# Patient Record
Sex: FEMALE | Race: WHITE | NOT HISPANIC OR LATINO | URBAN - METROPOLITAN AREA
[De-identification: names, ages, dates, MRNs, and addresses within clinical notes are randomized per-mention and may not be internally consistent; named-entity substitution may affect disease eponyms.]

---
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
## 2025-02-07 ENCOUNTER — OFFICE VISIT (OUTPATIENT)
Dept: URGENT CARE | Facility: CLINIC | Age: 20
End: 2025-02-07
Payer: COMMERCIAL

## 2025-02-07 VITALS
DIASTOLIC BLOOD PRESSURE: 70 MMHG | OXYGEN SATURATION: 98 % | HEART RATE: 79 BPM | RESPIRATION RATE: 14 BRPM | BODY MASS INDEX: 23.37 KG/M2 | WEIGHT: 154.2 LBS | HEIGHT: 68 IN | TEMPERATURE: 97.5 F | SYSTOLIC BLOOD PRESSURE: 98 MMHG

## 2025-02-07 DIAGNOSIS — J03.80 ACUTE BACTERIAL TONSILLITIS: ICD-10-CM

## 2025-02-07 DIAGNOSIS — B96.89 ACUTE BACTERIAL TONSILLITIS: ICD-10-CM

## 2025-02-07 PROCEDURE — 3078F DIAST BP <80 MM HG: CPT | Performed by: NURSE PRACTITIONER

## 2025-02-07 PROCEDURE — 99213 OFFICE O/P EST LOW 20 MIN: CPT | Performed by: NURSE PRACTITIONER

## 2025-02-07 PROCEDURE — 3074F SYST BP LT 130 MM HG: CPT | Performed by: NURSE PRACTITIONER

## 2025-02-07 RX ORDER — DEXAMETHASONE 6 MG/1
6 TABLET ORAL DAILY
Qty: 5 TABLET | Refills: 0 | Status: SHIPPED | OUTPATIENT
Start: 2025-02-07 | End: 2025-02-12

## 2025-04-29 ENCOUNTER — HOSPITAL ENCOUNTER (OUTPATIENT)
Facility: MEDICAL CENTER | Age: 20
End: 2025-04-29
Attending: PHYSICIAN ASSISTANT
Payer: COMMERCIAL

## 2025-05-05 ENCOUNTER — HOSPITAL ENCOUNTER (OUTPATIENT)
Dept: LAB | Facility: MEDICAL CENTER | Age: 20
End: 2025-05-05
Attending: PHYSICIAN ASSISTANT
Payer: COMMERCIAL

## 2025-05-05 LAB
CHOLEST SERPL-MCNC: 148 MG/DL (ref 100–199)
DHEA-S SERPL-MCNC: 80.8 UG/DL (ref 148–407)
FSH SERPL-ACNC: 4.7 MIU/ML
HCG SERPL QL: NEGATIVE
HDLC SERPL-MCNC: 66 MG/DL
LDLC SERPL CALC-MCNC: 70 MG/DL
LH SERPL-ACNC: 10.5 IU/L
PROLACTIN SERPL-MCNC: 21.7 NG/ML (ref 2.8–26)
TRIGL SERPL-MCNC: 59 MG/DL (ref 0–149)
TSH SERPL-ACNC: 1.13 UIU/ML (ref 0.38–5.33)

## 2025-05-05 PROCEDURE — 84443 ASSAY THYROID STIM HORMONE: CPT

## 2025-05-05 PROCEDURE — 84146 ASSAY OF PROLACTIN: CPT

## 2025-05-05 PROCEDURE — 83001 ASSAY OF GONADOTROPIN (FSH): CPT

## 2025-05-05 PROCEDURE — 83002 ASSAY OF GONADOTROPIN (LH): CPT

## 2025-05-05 PROCEDURE — 80061 LIPID PANEL: CPT

## 2025-05-05 PROCEDURE — 84703 CHORIONIC GONADOTROPIN ASSAY: CPT

## 2025-05-05 PROCEDURE — 82627 DEHYDROEPIANDROSTERONE: CPT

## 2025-05-05 PROCEDURE — 36415 COLL VENOUS BLD VENIPUNCTURE: CPT

## 2025-05-05 PROCEDURE — 82166 ASSAY ANTI-MULLERIAN HORM: CPT

## 2025-05-09 LAB — MIS SERPL-MCNC: 7.99 NG/ML (ref 0.4–16.02)

## 2025-05-28 ENCOUNTER — OFFICE VISIT (OUTPATIENT)
Dept: URGENT CARE | Facility: CLINIC | Age: 20
End: 2025-05-28
Payer: COMMERCIAL

## 2025-05-28 VITALS
TEMPERATURE: 97.3 F | WEIGHT: 155.4 LBS | HEART RATE: 82 BPM | SYSTOLIC BLOOD PRESSURE: 90 MMHG | DIASTOLIC BLOOD PRESSURE: 60 MMHG | BODY MASS INDEX: 23.55 KG/M2 | OXYGEN SATURATION: 97 % | HEIGHT: 68 IN | RESPIRATION RATE: 13 BRPM

## 2025-05-28 DIAGNOSIS — N39.0 URINARY TRACT INFECTION WITHOUT HEMATURIA, SITE UNSPECIFIED: ICD-10-CM

## 2025-05-28 DIAGNOSIS — R30.0 DYSURIA: Primary | ICD-10-CM

## 2025-05-28 LAB
APPEARANCE UR: ABNORMAL
BILIRUB UR STRIP-MCNC: NEGATIVE MG/DL
COLOR UR AUTO: ABNORMAL
GLUCOSE UR STRIP.AUTO-MCNC: NEGATIVE MG/DL
KETONES UR STRIP.AUTO-MCNC: NEGATIVE MG/DL
LEUKOCYTE ESTERASE UR QL STRIP.AUTO: ABNORMAL
NITRITE UR QL STRIP.AUTO: NEGATIVE
PH UR STRIP.AUTO: 8.5 [PH] (ref 5–8)
POCT INT CON NEG: NEGATIVE
POCT INT CON POS: POSITIVE
POCT URINE PREGNANCY TEST: NEGATIVE
PROT UR QL STRIP: 100 MG/DL
RBC UR QL AUTO: ABNORMAL
SP GR UR STRIP.AUTO: 1.02
UROBILINOGEN UR STRIP-MCNC: 0.2 MG/DL

## 2025-05-28 PROCEDURE — 99213 OFFICE O/P EST LOW 20 MIN: CPT | Performed by: PHYSICIAN ASSISTANT

## 2025-05-28 PROCEDURE — 81002 URINALYSIS NONAUTO W/O SCOPE: CPT | Performed by: PHYSICIAN ASSISTANT

## 2025-05-28 PROCEDURE — 3074F SYST BP LT 130 MM HG: CPT | Performed by: PHYSICIAN ASSISTANT

## 2025-05-28 PROCEDURE — 81025 URINE PREGNANCY TEST: CPT | Performed by: PHYSICIAN ASSISTANT

## 2025-05-28 PROCEDURE — 3078F DIAST BP <80 MM HG: CPT | Performed by: PHYSICIAN ASSISTANT

## 2025-05-28 RX ORDER — CEFDINIR 300 MG/1
300 CAPSULE ORAL 2 TIMES DAILY
Qty: 10 CAPSULE | Refills: 0 | Status: SHIPPED | OUTPATIENT
Start: 2025-05-28 | End: 2025-06-02

## 2025-05-28 ASSESSMENT — ENCOUNTER SYMPTOMS
EYE REDNESS: 0
CHANGE IN BOWEL HABIT: 0
NAUSEA: 0
FEVER: 0
ABDOMINAL PAIN: 0
EYE DISCHARGE: 0
VOMITING: 0
FLANK PAIN: 0

## 2025-05-29 NOTE — PROGRESS NOTES
"Subjective     Amina Faust is a 19 y.o. female who presents with UTI (Sx started on Friday, discomfort, burning when urinating, pressure and pain, urinary frequency )            UTI  This is a new problem. Episode onset: x 5 days ago. The problem occurs constantly. Associated symptoms include urinary symptoms (The patient reports associated dysuria with urinary frequency and urinary urgency.  She reports no hematuria.  She also reports no flank pain.). Pertinent negatives include no abdominal pain, change in bowel habit, fever, nausea or vomiting. Treatments tried: OTC cranberry pills; OTC cranberry juice;     PMH:  has no past medical history on file.  MEDS: Current Medications[1]  ALLERGIES: Allergies[2]  SURGHX: Past Surgical History[3]  SOCHX:  reports that she has never smoked. She has never used smokeless tobacco. She reports that she does not drink alcohol and does not use drugs.  FH: Family history was reviewed, no pertinent findings to report      Review of Systems   Constitutional:  Negative for fever.   Eyes:  Negative for discharge and redness.   Gastrointestinal:  Negative for abdominal pain, change in bowel habit, nausea and vomiting.   Genitourinary:  Positive for dysuria, frequency and urgency. Negative for flank pain and hematuria.              Objective     BP 90/60 (BP Location: Left arm, Patient Position: Sitting, BP Cuff Size: Adult)   Pulse 82   Temp 36.3 °C (97.3 °F) (Temporal)   Resp 13   Ht 1.727 m (5' 8\")   Wt 70.5 kg (155 lb 6.4 oz)   SpO2 97%   BMI 23.63 kg/m²      Physical Exam  Constitutional:       General: She is not in acute distress.     Appearance: Normal appearance. She is well-developed. She is not ill-appearing.   HENT:      Head: Normocephalic and atraumatic.      Right Ear: External ear normal.      Left Ear: External ear normal.   Eyes:      Extraocular Movements: Extraocular movements intact.      Conjunctiva/sclera: Conjunctivae normal. "   Cardiovascular:      Rate and Rhythm: Normal rate and regular rhythm.      Heart sounds: Normal heart sounds.   Pulmonary:      Effort: Pulmonary effort is normal. No respiratory distress.      Breath sounds: Normal breath sounds. No wheezing.   Abdominal:      Palpations: Abdomen is soft.      Tenderness: There is no abdominal tenderness. There is no right CVA tenderness or left CVA tenderness.   Musculoskeletal:         General: Normal range of motion.      Cervical back: Normal range of motion and neck supple.   Skin:     General: Skin is warm and dry.   Neurological:      Mental Status: She is alert and oriented to person, place, and time.               Progress:  Results for orders placed or performed in visit on 05/28/25   POCT Urinalysis    Collection Time: 05/28/25  5:49 PM   Result Value Ref Range    POC Color other Negative    POC Appearance cloudy Negative    POC Glucose Negative Negative mg/dL    POC Bilirubin Negative Negative mg/dL    POC Ketones Negative Negative mg/dL    POC Specific Gravity 1.020 <1.005 - >1.030    POC Blood Trace Negative    POC Urine PH 8.5 (A) 5.0 - 8.0    POC Protein 100 Negative mg/dL    POC Urobiligen 0.2 Negative (0.2) mg/dL    POC Nitrites Negative Negative    POC Leukocyte Esterase Trace Negative   POCT Pregnancy    Collection Time: 05/28/25  5:51 PM   Result Value Ref Range    POC Urine Pregnancy Test Negative     Internal Control Positive Positive     Internal Control Negative Negative      s                Assessment & Plan  Dysuria    Orders:    POCT Urinalysis    POCT Pregnancy    Urinary tract infection without hematuria, site unspecified    Orders:    cefdinir (OMNICEF) 300 MG Cap; Take 1 Capsule by mouth 2 times a day for 5 days.         The patient's presenting symptoms and physical exam findings are consistent with dysuria likely secondary to an acute urinary tract infection.The patient's physical exam today in clinic was normal.  No CVA tenderness was  appreciated.  The patient is nontoxic and appears in no acute distress.  The patient's vital signs are stable and within normal limits.  She is afebrile today in clinic.  The patient's POCT urinalysis today in clinic showed trace leukocyte esterase and trace blood with negative nitrates.  The patient's POCT pregnancy test was negative.  Will prescribe the patient cefdinir for presumed urinary tract infection.  Advised the patient to monitor for worsening signs and or symptoms.  Recommend OTC medications and supportive care for symptomatic management.  Recommend patient follow-up with primary care as needed.  Discussed return precautions with the patient, and she verbalized understanding.    Differential diagnoses, supportive care, and indications for immediate follow-up discussed with patient.   Instructed to return to clinic or nearest emergency department for any change in condition, further concerns, or worsening of symptoms.    OTC Tylenol or Motrin for fever/discomfort.  Drink plenty of fluids  Follow-up with PCP  Return to clinic or go to the ED if symptoms worsen or fail to improve, or if the patient should develop worsening/increasing urinary symptoms, hematuria, flank pain, abdominal pain, nausea/vomiting, fever/chills, and/or any concerning symptoms.    Discussed plan with the patient, and she agrees to the above.     I personally reviewed prior external notes and test results pertinent to today's visit.  I have independently reviewed and interpreted all diagnostics ordered during this urgent care visit.     Please note that this dictation was created using voice recognition software. I have made every reasonable attempt to correct obvious errors, but I expect that there may be errors of grammar and possibly content that I did not discover before finalizing the note.     This note was electronically signed by Evelin Barcenas PA-C                 [1]   Current Outpatient Medications:     triamcinolone  acetonide (KENALOG) 0.1 % Cream, Apply 1 Application topically 2 times a day as needed (rash). (Patient not taking: Reported on 5/28/2025), Disp: 28.4 g, Rfl: 0    fluconazole (DIFLUCAN) 150 MG tablet, Take 1 Tablet by mouth every day. (Patient not taking: Reported on 5/28/2025), Disp: 1 Tablet, Rfl: 0    phenazopyridine (PYRIDIUM) 200 MG Tab, Take 1 Tablet by mouth 3 times a day as needed (urinary pain). (Patient not taking: Reported on 2/9/2023), Disp: 6 Tablet, Rfl: 0    VIENVA 0.1-20 MG-MCG per tablet, , Disp: , Rfl:   [2] No Known Allergies  [3] History reviewed. No pertinent surgical history.